# Patient Record
Sex: FEMALE | Race: WHITE | NOT HISPANIC OR LATINO | Employment: OTHER | URBAN - NONMETROPOLITAN AREA
[De-identification: names, ages, dates, MRNs, and addresses within clinical notes are randomized per-mention and may not be internally consistent; named-entity substitution may affect disease eponyms.]

---

## 2020-02-18 ENCOUNTER — APPOINTMENT (EMERGENCY)
Dept: RADIOLOGY | Facility: HOSPITAL | Age: 75
End: 2020-02-18
Payer: MEDICARE

## 2020-02-18 ENCOUNTER — HOSPITAL ENCOUNTER (EMERGENCY)
Facility: HOSPITAL | Age: 75
Discharge: HOME | End: 2020-02-18
Attending: EMERGENCY MEDICINE | Admitting: EMERGENCY MEDICINE
Payer: MEDICARE

## 2020-02-18 VITALS
BODY MASS INDEX: 40.58 KG/M2 | OXYGEN SATURATION: 100 % | RESPIRATION RATE: 20 BRPM | HEIGHT: 69 IN | DIASTOLIC BLOOD PRESSURE: 86 MMHG | TEMPERATURE: 98 F | WEIGHT: 274 LBS | SYSTOLIC BLOOD PRESSURE: 136 MMHG | HEART RATE: 90 BPM

## 2020-02-18 DIAGNOSIS — K59.00 CONSTIPATION: Primary | ICD-10-CM

## 2020-02-18 DIAGNOSIS — K59.00 CONSTIPATION, UNSPECIFIED CONSTIPATION TYPE: ICD-10-CM

## 2020-02-18 LAB
ALBUMIN SERPL BCP-MCNC: 4 G/DL (ref 3.5–5)
ALP SERPL-CCNC: 100 U/L (ref 46–116)
ALT SERPL W P-5'-P-CCNC: 22 U/L (ref 12–78)
ANION GAP SERPL CALCULATED.3IONS-SCNC: 9 MMOL/L (ref 4–13)
AST SERPL W P-5'-P-CCNC: 14 U/L (ref 5–45)
BILIRUB SERPL-MCNC: 0.48 MG/DL (ref 0.2–1)
BUN SERPL-MCNC: 14 MG/DL (ref 5–25)
CALCIUM SERPL-MCNC: 9.5 MG/DL (ref 8.3–10.1)
CHLORIDE SERPL-SCNC: 100 MMOL/L (ref 100–108)
CO2 SERPL-SCNC: 31 MMOL/L (ref 21–32)
CREAT SERPL-MCNC: 1.44 MG/DL (ref 0.6–1.3)
GFR SERPL CREATININE-BSD FRML MDRD: 35 ML/MIN/1.73SQ M
GLUCOSE SERPL-MCNC: 118 MG/DL (ref 65–140)
POTASSIUM SERPL-SCNC: 4.4 MMOL/L (ref 3.5–5.3)
PROT SERPL-MCNC: 8.2 G/DL (ref 6.4–8.2)
SODIUM SERPL-SCNC: 140 MMOL/L (ref 136–145)

## 2020-02-18 PROCEDURE — 99282 EMERGENCY DEPT VISIT SF MDM: CPT | Performed by: EMERGENCY MEDICINE

## 2020-02-18 PROCEDURE — 74022 RADEX COMPL AQT ABD SERIES: CPT

## 2020-02-18 PROCEDURE — 80053 COMPREHEN METABOLIC PANEL: CPT | Performed by: EMERGENCY MEDICINE

## 2020-02-18 PROCEDURE — 99284 EMERGENCY DEPT VISIT MOD MDM: CPT

## 2020-02-18 PROCEDURE — 36415 COLL VENOUS BLD VENIPUNCTURE: CPT | Performed by: EMERGENCY MEDICINE

## 2020-02-18 RX ORDER — LEVOTHYROXINE SODIUM 0.12 MG/1
125 TABLET ORAL DAILY
COMMUNITY

## 2020-02-18 RX ORDER — ASPIRIN 81 MG/1
81 TABLET, CHEWABLE ORAL DAILY
COMMUNITY

## 2020-02-18 RX ORDER — TELMISARTAN AND HYDROCHLORTHIAZIDE 80; 25 MG/1; MG/1
1 TABLET ORAL DAILY
COMMUNITY

## 2020-02-18 RX ORDER — AMLODIPINE BESYLATE 5 MG/1
5 TABLET ORAL DAILY
COMMUNITY
End: 2022-07-16

## 2020-02-18 RX ORDER — FUROSEMIDE 20 MG/1
10 TABLET ORAL AS NEEDED
COMMUNITY

## 2020-02-18 RX ORDER — BISOPROLOL FUMARATE 10 MG/1
10 TABLET ORAL DAILY
COMMUNITY

## 2020-02-18 RX ORDER — INSULIN ASPART 100 [IU]/ML
60 INJECTION, SUSPENSION SUBCUTANEOUS
COMMUNITY

## 2020-02-18 NOTE — DISCHARGE INSTRUCTIONS
You may use magnesium citrate which is available over-the-counter  Please follow-up with your primary care physician if you are not improving  Your calcium level today was found to be normal     Your imaging studies have been preliminarily reviewed by the emergency department  Further review by Radiology is pending at this time  If the discrepancy or a finding of additional concern is identified, we will attempt to contact you at the number you have provided  Follow-up with your primary care provider within 1-2 weeks is recommended in all cases if you do not hear from us to ensure that all findings were normal or as initially reported  Your results may also be available on [facility]      Thank you for choosing the emergency department at [facility]  We appreciated the opportunity and privilege to address your healthcare needs  We remain available to you should you require additional evaluation or assistance  We value your feedback and would appreciate the opportunity to address anything you identified as an opportunity to improve or where we excelled  If there are colleagues who deserve special recognition, please let us know! We hope you are feeling better soon!

## 2020-02-18 NOTE — ED PROVIDER NOTES
History  Chief Complaint   Patient presents with    Constipation     pt  states she has been constipated, pt  hasn'y had a "decent" bowel movement for weeks, she did pass a small amount of stool today but with no relief, pt  alsop reports feeling lightheaded and legs got numb from the knees down b/l, pt  called PCP but  no return call all day today, denies n/v     Patient to the emergency room reporting that she has been unable to significantly move her bowels for last 2 weeks  She feels as if she is bloated  She denies any pain  She has had no nausea or vomiting  She has no fevers  She states that over the last 24 hours she has felt anxious about this  She denies any new medications specifically any opioid pain medications  She denies any over-the-counter medications or other changes in at her normal meds  There is changed her diet and no recent travel  She does have a history of diabetes and also reports that as of late she has been feeling some stocking-glove type numbness in her lower extremities from about the level of the knees on down words  She is however ambulatory as per her baseline which is usually with a walker or cane  She does not appear acutely toxic in the emergency department        History provided by:  Patient and spouse  Constipation   Severity:  Moderate  Timing:  Constant  Progression:  Unchanged  Chronicity:  New  Context: not dehydration, not dietary changes, not medication, not narcotics and not stress    Stool description:  Hard and pellet like  Relieved by:  Nothing  Worsened by:  Nothing  Ineffective treatments:  Laxatives and stool softeners  Associated symptoms: flatus    Associated symptoms: no abdominal pain, no back pain, no diarrhea, no dysuria, no fever, no nausea, no urinary retention and no vomiting    Risk factors: hx of abdominal surgery, recent illness and recent surgery    Risk factors: no change in medication and no recent antibiotic use        Prior to Admission Medications   Prescriptions Last Dose Informant Patient Reported? Taking? amLODIPine (NORVASC) 5 mg tablet   Yes Yes   Sig: Take 5 mg by mouth daily   aspirin 81 mg chewable tablet   Yes Yes   Sig: Chew 81 mg daily   bisoprolol (ZEBETA) 10 MG tablet   Yes Yes   Sig: Take 10 mg by mouth daily   furosemide (LASIX) 20 mg tablet   Yes Yes   Sig: Take 10 mg by mouth as needed   insulin aspart protamine-insulin aspart (NovoLOG 70/30) 100 units/mL injection   Yes Yes   Sig: Inject 60 Units under the skin 2 (two) times a day before meals   levothyroxine 125 mcg tablet   Yes Yes   Sig: Take 125 mcg by mouth daily   metFORMIN (GLUCOPHAGE) 500 mg tablet   Yes Yes   Sig: Take 1,000 mg by mouth 2 (two) times a day with meals   telmisartan-hydrochlorothiazide (MICARDIS HCT) 80-25 MG per tablet   Yes Yes   Sig: Take 1 tablet by mouth daily      Facility-Administered Medications: None       Past Medical History:   Diagnosis Date    Diabetes mellitus ([facility] [address] )     Disease of thyroid gland     Hyperlipidemia     Hypertension     Stroke [facility])        Past Surgical History:   Procedure Laterality Date    HYSTERECTOMY      JOINT REPLACEMENT      REPLACEMENT TOTAL KNEE      THYROIDECTOMY      TOTAL HIP ARTHROPLASTY         History reviewed  No pertinent family history  I have reviewed and agree with the history as documented  Social History     Tobacco Use    Smoking status: Never Smoker    Smokeless tobacco: Never Used   Substance Use Topics    Alcohol use: Not Currently    Drug use: Never       Review of Systems   Constitutional: Negative for diaphoresis and fever  HENT: Negative for congestion, ear pain, rhinorrhea, sinus pressure, sinus pain, sore throat and tinnitus  Eyes: Negative for discharge, itching and visual disturbance  Respiratory: Negative for cough, chest tightness, shortness of breath and wheezing  Cardiovascular: Negative for chest pain, palpitations and leg swelling     Gastrointestinal: Positive for constipation and flatus  Negative for abdominal pain, blood in stool, diarrhea, nausea and vomiting  Endocrine: Negative for polydipsia and polyuria  Genitourinary: Negative for decreased urine volume, dysuria and flank pain  Musculoskeletal: Negative for arthralgias and back pain  Skin: Negative for pallor and rash  Neurological: Negative for dizziness, weakness and headaches  Hematological: Does not bruise/bleed easily  Psychiatric/Behavioral: Negative for sleep disturbance  The patient is not nervous/anxious  All other systems reviewed and are negative  Physical Exam  Physical Exam   Constitutional: She is oriented to person, place, and time  She appears well-developed and well-nourished  HENT:   Head: Normocephalic and atraumatic  Eyes: Pupils are equal, round, and reactive to light  EOM are normal    Neck: Normal range of motion  Neck supple  Cardiovascular: Normal rate, regular rhythm and normal heart sounds  No murmur heard  Pulmonary/Chest: Effort normal and breath sounds normal  No stridor  No respiratory distress  She has no wheezes  She has no rales  She exhibits no tenderness  Abdominal: Soft  Bowel sounds are normal  She exhibits distension  There is no tenderness  There is no rebound and no guarding  Musculoskeletal: Normal range of motion  She exhibits no edema or deformity  Neurological: She is alert and oriented to person, place, and time  No cranial nerve deficit  Skin: Skin is warm and dry  No rash noted  No pallor  Psychiatric: She has a normal mood and affect  Her behavior is normal    Nursing note and vitals reviewed        Vital Signs  ED Triage Vitals [02/18/20 1614]   Temperature Pulse Respirations Blood Pressure SpO2   98 4 °F (36 9 °C) 84 20 164/72 100 %      Temp Source Heart Rate Source Patient Position - Orthostatic VS BP Location FiO2 (%)   Oral Monitor Sitting Right arm --      Pain Score       No Pain           Vitals:    02/18/20 1614   BP: 164/72   Pulse: 84   Patient Position - Orthostatic VS: Sitting         Visual Acuity      ED Medications  Medications - No data to display    Diagnostic Studies  Results Reviewed     Procedure Component Value Units Date/Time    Comprehensive metabolic panel [[id number]]  (Abnormal) Collected:  02/18/20 1716    Lab Status:  Final result Specimen:  Blood from Arm, Left Updated:  02/18/20 1737     Sodium 140 mmol/L      Potassium 4 4 mmol/L      Chloride 100 mmol/L      CO2 31 mmol/L      ANION GAP 9 mmol/L      BUN 14 mg/dL      Creatinine 1 44 mg/dL      Glucose 118 mg/dL      Calcium 9 5 mg/dL      AST 14 U/L      ALT 22 U/L      Alkaline Phosphatase 100 U/L      Total Protein 8 2 g/dL      Albumin 4 0 g/dL      Total Bilirubin 0 48 mg/dL      eGFR 35 ml/min/1 73sq m     Narrative:       [place] guidelines for Chronic Kidney Disease (CKD):     Stage 1 with normal or high GFR (GFR > 90 mL/min/1 73 square meters)    Stage 2 Mild CKD (GFR = 60-89 mL/min/1 73 square meters)    Stage 3A Moderate CKD (GFR = 45-59 mL/min/1 73 square meters)    Stage 3B Moderate CKD (GFR = 30-44 mL/min/1 73 square meters)    Stage 4 Severe CKD (GFR = 15-29 mL/min/1 73 square meters)    Stage 5 End Stage CKD (GFR <15 mL/min/1 73 square meters)  Note: GFR calculation is accurate only with a steady state creatinine                 XR abdomen obstruction series   ED Interpretation by Yordan Chan DO (02/18 1814)   No obstruction and no free air  Procedures  Procedures         ED Course  ED Course as of Feb 18 1815   Tue Feb 18, 2020   1741 Calcium: 9 5 1812 Patient had a bowel movement after the enema and she was advised to use magnesium citrate at home  Stable for discharge                                    MDM      Disposition  Final diagnoses:   Constipation   Constipation, unspecified constipation type     Time reflects when diagnosis was documented in both MDM as applicable and the Disposition within this note     Time User Action Codes Description Comment    2/18/2020  4:51 PM Ioana Mckeon Add [K59 00] Constipation     2/18/2020  5:57 PM Yodit Gardner Add [K59 00] Constipation, unspecified constipation type       ED Disposition     ED Disposition Condition Date/Time Comment    Discharge Stable Tue Feb 18, 2020  6:12 PM Leann Chavez discharge to home/self care  Follow-up Information     Follow up With Specialties Details Why Contact Info    Quirino Andre MD  In 2 days If not better [address] [facility]  [phone]            Patient's Medications   Discharge Prescriptions    No medications on file     No discharge procedures on file      PDMP Review     None          ED Provider  Electronically Signed by           Yordan Chan DO  02/18/20 3143

## 2021-02-02 ENCOUNTER — APPOINTMENT (EMERGENCY)
Dept: CT IMAGING | Facility: HOSPITAL | Age: 76
End: 2021-02-02
Payer: MEDICARE

## 2021-02-02 ENCOUNTER — APPOINTMENT (EMERGENCY)
Dept: MRI IMAGING | Facility: HOSPITAL | Age: 76
End: 2021-02-02
Payer: MEDICARE

## 2021-02-02 ENCOUNTER — HOSPITAL ENCOUNTER (EMERGENCY)
Facility: HOSPITAL | Age: 76
Discharge: HOME | End: 2021-02-02
Attending: EMERGENCY MEDICINE | Admitting: EMERGENCY MEDICINE
Payer: MEDICARE

## 2021-02-02 VITALS
BODY MASS INDEX: 39.3 KG/M2 | SYSTOLIC BLOOD PRESSURE: 178 MMHG | HEART RATE: 77 BPM | RESPIRATION RATE: 19 BRPM | WEIGHT: 266.1 LBS | OXYGEN SATURATION: 98 % | DIASTOLIC BLOOD PRESSURE: 77 MMHG | TEMPERATURE: 97.9 F
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
## 2021-02-02 DIAGNOSIS — S09.90XA INJURY OF HEAD, INITIAL ENCOUNTER: ICD-10-CM

## 2021-02-02 DIAGNOSIS — S01.01XA LACERATION OF SCALP, INITIAL ENCOUNTER: ICD-10-CM

## 2021-02-02 LAB
ANION GAP SERPL CALCULATED.3IONS-SCNC: 13 MMOL/L (ref 4–13)
BASOPHILS # BLD AUTO: 0.08 THOUSANDS/ΜL (ref 0–0.1)
BASOPHILS NFR BLD AUTO: 1 % (ref 0–1)
BUN SERPL-MCNC: 35 MG/DL (ref 5–25)
CALCIUM SERPL-MCNC: 8.7 MG/DL (ref 8.3–10.1)
CHLORIDE SERPL-SCNC: 98 MMOL/L (ref 100–108)
CO2 SERPL-SCNC: 26 MMOL/L (ref 21–32)
CREAT SERPL-MCNC: 1.65 MG/DL (ref 0.6–1.3)
EOSINOPHIL # BLD AUTO: 0.42 THOUSAND/ΜL (ref 0–0.61)
EOSINOPHIL NFR BLD AUTO: 4 % (ref 0–6)
ERYTHROCYTE [DISTWIDTH] IN BLOOD BY AUTOMATED COUNT: 14.3 % (ref 11.6–15.1)
GFR SERPL CREATININE-BSD FRML MDRD: 30 ML/MIN/1.73SQ M
GLUCOSE SERPL-MCNC: 258 MG/DL (ref 65–140)
HCT VFR BLD AUTO: 31.7 % (ref 34.8–46.1)
HGB BLD-MCNC: 10.7 G/DL (ref 11.5–15.4)
IMM GRANULOCYTES # BLD AUTO: 0.06 THOUSAND/UL (ref 0–0.2)
IMM GRANULOCYTES NFR BLD AUTO: 1 % (ref 0–2)
LYMPHOCYTES # BLD AUTO: 1.16 THOUSANDS/ΜL (ref 0.6–4.47)
LYMPHOCYTES NFR BLD AUTO: 11 % (ref 14–44)
MCH RBC QN AUTO: 29.1 PG (ref 26.8–34.3)
MCHC RBC AUTO-ENTMCNC: 33.8 G/DL (ref 31.4–37.4)
MCV RBC AUTO: 86 FL (ref 82–98)
MONOCYTES # BLD AUTO: 0.69 THOUSAND/ΜL (ref 0.17–1.22)
MONOCYTES NFR BLD AUTO: 6 % (ref 4–12)
NEUTROPHILS # BLD AUTO: 8.59 THOUSANDS/ΜL (ref 1.85–7.62)
NEUTS SEG NFR BLD AUTO: 77 % (ref 43–75)
NRBC BLD AUTO-RTO: 0 /100 WBCS
PLATELET # BLD AUTO: 241 THOUSANDS/UL (ref 149–390)
PMV BLD AUTO: 8.9 FL (ref 8.9–12.7)
POTASSIUM SERPL-SCNC: 3.6 MMOL/L (ref 3.5–5.3)
RBC # BLD AUTO: 3.68 MILLION/UL (ref 3.81–5.12)
SODIUM SERPL-SCNC: 137 MMOL/L (ref 136–145)
WBC # BLD AUTO: 11 THOUSAND/UL (ref 4.31–10.16)

## 2021-02-02 PROCEDURE — 70551 MRI BRAIN STEM W/O DYE: CPT

## 2021-02-02 PROCEDURE — 80048 BASIC METABOLIC PNL TOTAL CA: CPT | Performed by: EMERGENCY MEDICINE

## 2021-02-02 PROCEDURE — 85025 COMPLETE CBC W/AUTO DIFF WBC: CPT | Performed by: EMERGENCY MEDICINE

## 2021-02-02 PROCEDURE — G1004 CDSM NDSC: HCPCS

## 2021-02-02 PROCEDURE — 93005 ELECTROCARDIOGRAM TRACING: CPT

## 2021-02-02 PROCEDURE — 36415 COLL VENOUS BLD VENIPUNCTURE: CPT | Performed by: EMERGENCY MEDICINE

## 2021-02-02 PROCEDURE — 99285 EMERGENCY DEPT VISIT HI MDM: CPT | Performed by: EMERGENCY MEDICINE

## 2021-02-02 PROCEDURE — 70450 CT HEAD/BRAIN W/O DYE: CPT

## 2021-02-02 PROCEDURE — 99284 EMERGENCY DEPT VISIT MOD MDM: CPT

## 2021-02-02 PROCEDURE — 12002 RPR S/N/AX/GEN/TRNK2.6-7.5CM: CPT | Performed by: EMERGENCY MEDICINE

## 2021-02-02 RX ORDER — LIDOCAINE HYDROCHLORIDE AND EPINEPHRINE 10; 10 MG/ML; UG/ML
20 INJECTION, SOLUTION INFILTRATION; PERINEURAL ONCE
Status: DISCONTINUED | OUTPATIENT
Start: 2021-02-02 | End: 2021-02-02 | Stop reason: HOSPADM

## 2021-02-02 NOTE — ED PROVIDER NOTES
Emergency Department Trauma Note  Norbert Cueto 66 y o  female MRN: [id number]  Unit/Bed#: ED 05/ED 05 Encounter: [id number]      Trauma Alert: Trauma Acuity: Trauma Evaluation  Model of Arrival:   via    Trauma Team: Current Providers  Attending Provider: Barbra Baker DO  Registered Nurse: Robson Hough RN  Registered Nurse: Benita Sanchez RN  Consultants: None      History of Present Illness     Chief Complaint:   Chief Complaint   Patient presents with   Marguarite Neri     pt lost balance while in dining room falling backwards hitting posterior head against radiator  pt c/o headache  denies loc  pt takes aspirin daily  HPI:  Norbert Cueto is a 66 y o  female who presents with fall/head injury  Mechanism:Details of Incident: pt lost balance falling backwards in dining room hitting posterior head against radiator  pt c/o headache  denies loc  laceration posterior head  Injury Date: 02/02/21 Injury Time: 1322 Injury Occurence Location - [address] [place] [address] Way: sunny     Patient is a 77-year-old female brought to the emergency department by spouse secondary to head injury secondary to fall, patient states she misstepped and tripped on a rug, causing her to fall backwards and hitting her head on a cast iron piece of furniture, this caused a laceration, she denies any loss of consciousness, no nausea or vomiting, denies pain or injury other than posterior scalp, she does currently take aspirin 81 mg daily        Review of Systems   Constitutional: Negative  HENT: Negative  Eyes: Negative  Respiratory: Negative  Cardiovascular: Negative  Gastrointestinal: Negative  Endocrine: Negative  Genitourinary: Negative  Musculoskeletal: Negative  Skin: Negative  Allergic/Immunologic: Negative  Neurological: Positive for headaches (Head injury, scalp laceration and tenderness)  Hematological: Negative  Psychiatric/Behavioral: Negative          Historical Information     Immunizations: There is no immunization history on file for this patient  Past Medical History:   Diagnosis Date    Diabetes mellitus ([facility] [address] )     Disease of thyroid gland     Hyperlipidemia     Hypertension     [facility]      No family history on file  Past Surgical History:   Procedure Laterality Date    HYSTERECTOMY      JOINT REPLACEMENT      REPLACEMENT TOTAL KNEE      THYROIDECTOMY      TOTAL HIP ARTHROPLASTY       Social History     Tobacco Use    Smoking status: Never Smoker    Smokeless tobacco: Never Used   Substance Use Topics    Alcohol use: Not Currently    Drug use: Never     E-Cigarette/Vaping    E-Cigarette Use Never User      E-Cigarette/Vaping Substances    Nicotine No     THC No     CBD No     Flavoring No     Other No        Family History: non-contributory    Meds/Allergies   Prior to Admission Medications   Prescriptions Last Dose Informant Patient Reported? Taking?    amLODIPine (NORVASC) 5 mg tablet   Yes No   Sig: Take 5 mg by mouth daily   aspirin 81 mg chewable tablet   Yes No   Sig: Chew 81 mg daily   bisoprolol (ZEBETA) 10 MG tablet   Yes No   Sig: Take 10 mg by mouth daily   furosemide (LASIX) 20 mg tablet   Yes No   Sig: Take 10 mg by mouth as needed   insulin aspart protamine-insulin aspart (NovoLOG 70/30) 100 units/mL injection   Yes No   Sig: Inject 60 Units under the skin 2 (two) times a day before meals   levothyroxine 125 mcg tablet   Yes No   Sig: Take 125 mcg by mouth daily   metFORMIN (GLUCOPHAGE) 500 mg tablet   Yes No   Sig: Take 1,000 mg by mouth 2 (two) times a day with meals   telmisartan-hydrochlorothiazide (MICARDIS HCT) 80-25 MG per tablet   Yes No   Sig: Take 1 tablet by mouth daily      Facility-Administered Medications: None       No Known Allergies    PHYSICAL EXAM    PE limited by: NA    Objective   Vitals:   First set: Temperature: 99 °F (37 2 °C) (02/02/21 1425)  Pulse: 89 (02/02/21 1425)  Respirations: 17 (02/02/21 1425)  Blood Pressure: (!) 192/77 (02/02/21 1425)  SpO2: 99 % (02/02/21 1425)    Primary Survey:   (A) Airway: intact  (B) Breathing: intact  (C) Circulation: Pulses:   normal  (D) Disabliity:  GCS Total:  15  (E) Expose:  Completed    Secondary Survey: (Click on Physical Exam tab above)  Physical Exam  Constitutional:       Appearance: She is well-developed  HENT:      Head: Normocephalic  Comments: Moderate-sized hematoma to posterior scalp, bleeding controlled, small laceration     Nose: Nose normal       Mouth/Throat:      Mouth: Mucous membranes are moist    Eyes:      Conjunctiva/sclera: Conjunctivae normal       Pupils: Pupils are equal, round, and reactive to light  Neck:      Musculoskeletal: Full passive range of motion without pain, normal range of motion and neck supple  No spinous process tenderness or muscular tenderness  Cardiovascular:      Rate and Rhythm: Normal rate  Pulmonary:      Effort: Pulmonary effort is normal    Abdominal:      Palpations: Abdomen is soft  Musculoskeletal: Normal range of motion  Skin:     General: Skin is warm and dry  Neurological:      Mental Status: She is alert and oriented to person, place, and time  Cervical spine cleared by clinical criteria?  Yes     Invasive Devices     None                 Lab Results:   Results Reviewed     Procedure Component Value Units Date/Time    Basic metabolic panel [[id number]]  (Abnormal) Collected: 02/02/21 1523    Lab Status: Final result Specimen: Blood from Hand, Right Updated: 02/02/21 1551     Sodium 137 mmol/L      Potassium 3 6 mmol/L      Chloride 98 mmol/L      CO2 26 mmol/L      ANION GAP 13 mmol/L      BUN 35 mg/dL      Creatinine 1 65 mg/dL      Glucose 258 mg/dL      Calcium 8 7 mg/dL      eGFR 30 ml/min/1 73sq m     Narrative:      [place] guidelines for Chronic Kidney Disease (CKD):     Stage 1 with normal or high GFR (GFR > 90 mL/min/1 73 square meters)    Stage 2 Mild CKD (GFR = 60-89 mL/min/1 73 square meters)    Stage 3A Moderate CKD (GFR = 45-59 mL/min/1 73 square meters)    Stage 3B Moderate CKD (GFR = 30-44 mL/min/1 73 square meters)    Stage 4 Severe CKD (GFR = 15-29 mL/min/1 73 square meters)    Stage 5 End Stage CKD (GFR <15 mL/min/1 73 square meters)  Note: GFR calculation is accurate only with a steady state creatinine    CBC and differential [[id number]]  (Abnormal) Collected: 02/02/21 1523    Lab Status: Final result Specimen: Blood from Hand, Right Updated: 02/02/21 1531     WBC 11 00 Thousand/uL      RBC 3 68 Million/uL      Hemoglobin 10 7 g/dL      Hematocrit 31 7 %      MCV 86 fL      MCH 29 1 pg      MCHC 33 8 g/dL      RDW 14 3 %      MPV 8 9 fL      Platelets 112 Thousands/uL      nRBC 0 /100 WBCs      Neutrophils Relative 77 %      Immat GRANS % 1 %      Lymphocytes Relative 11 %      Monocytes Relative 6 %      Eosinophils Relative 4 %      Basophils Relative 1 %      Neutrophils Absolute 8 59 Thousands/µL      Immature Grans Absolute 0 06 Thousand/uL      Lymphocytes Absolute 1 16 Thousands/µL      Monocytes Absolute 0 69 Thousand/µL      Eosinophils Absolute 0 42 Thousand/µL      Basophils Absolute 0 08 Thousands/µL                  Imaging Studies:   Direct to CT: Yes  MRI brain wo contrast   Final Result by Chantel Hennessy MD (02/02 1656)      No acute intracranial hemorrhage seen      There is no lesion corresponding to the abnormality noted on the CT in the region of the brainstem  A nodular focus seen within the left internal auditory canal in image 4 series 7 measuring about 3 mm, raises concern for incidental schwannoma  Consider follow-up nonemergent MRI with IAC protocol         An extra-axial lesion in the right frontal region in the region of the frontal operculum measuring 1 x 0 6 cm with appearance most suggestive of a meningioma        Workstation performed: [id number]         TRAUMA - CT head wo contrast   Final Result by Uvaldo Lester MD (45/86 8002) Hyperdensity in the brainstem does not appear to be related to just artifact and underlying hemorrhage or structural lesion could be present possibly mildly hyperdense cavernous angioma         Follow-up with MRI with and without contrast is advised  Circumscribed lesion adjacent to right temporal lobe measuring 1 cm probably represents a meningioma  Scalp soft tissue swelling without evidence of hemorrhage elsewhere  This was discussed with Dr Cedric De Leon at 3:00 PM                   Workstation performed: [id number]               Procedures  ECG 12 Lead Documentation Only    Date/Time: 2/2/2021 2:32 PM  Performed by: David Lai DO  Authorized by: David Lai DO     Indications / Diagnosis:  Trauma evaluation  ECG reviewed by me, the ED Provider: yes    Patient location:  ED  Previous ECG:     Previous ECG:  Unavailable    Comparison to cardiac monitor: Yes    Interpretation:     Interpretation: normal    Rate:     ECG rate:  84    ECG rate assessment: normal    Rhythm:     Rhythm: sinus rhythm    Ectopy:     Ectopy: none    QRS:     QRS intervals:  Normal  Conduction:     Conduction: normal    ST segments:     ST segments:  Normal  T waves:     T waves: normal    Q waves:     Q waves:  II, III, aVL, aVF, I, V4, V5 and V6             ED Course  ED Course as of Feb 02 2207   Tue Feb 02, 2021   1422   On exam, the patient had no midline point tenderness or paresthesias/numbness/weakness in the extremities  The patient had full range of motion (was then able to flex, extend, and rotate head laterally) without pain  There were no distracting injuries and the patient was not intoxicated  The patient's cervical spine was cleared clinically       David Lai DO  2/2/2021 2:22 PM     Patient does not require chest x-ray or pelvic x-ray as she has no chest pain and no pain in the pelvis      2206 Imaging findings discussed at bedside, including incidental findings of schwannoma and meningioma, MRI findings otherwise unremarkable, wound repaired using staples, patient given wound care instructions and instructions for follow-up, advised to return if symptoms worsen or any additional concerns, patient acknowledges understanding and agreement with this plan                    Disposition  [facility] Transfer: No  Final diagnoses:   Fall, initial encounter   Injury of head, initial encounter   Laceration of scalp, initial encounter     Time reflects when diagnosis was documented in both MDM as applicable and the Disposition within this note     Time User Action Codes Description Comment    2/2/2021  5:11 PM Markus Velasco Yoanna Garcia Fall, initial encounter     2/2/2021  5:11 PM [facility] [E44 [id number]] Injury of head, initial encounter     2/2/2021  5:12 PM Markus Velasco [S01 [id number]] Laceration of scalp, initial encounter       ED Disposition     ED Disposition Condition Date/Time Comment    Discharge Stable Tue Feb 2, 2021  5:11 PM Gregg Sender discharge to home/self care  Follow-up Information     Follow up With Specialties Details Why Contact Info    Jad Ventura MD  In 2 days  [address]          Discharge Medication List as of 2/2/2021  6:08 PM      CONTINUE these medications which have NOT CHANGED    Details   amLODIPine (NORVASC) 5 mg tablet Take 5 mg by mouth daily, Historical Med      aspirin 81 mg chewable tablet Chew 81 mg daily, Historical Med      bisoprolol (ZEBETA) 10 MG tablet Take 10 mg by mouth daily, Historical Med      furosemide (LASIX) 20 mg tablet Take 10 mg by mouth as needed, Historical Med      insulin aspart protamine-insulin aspart (NovoLOG 70/30) 100 units/mL injection Inject 60 Units under the skin 2 (two) times a day before meals, Historical Med      levothyroxine 125 mcg tablet Take 125 mcg by mouth daily, Historical Med      metFORMIN (GLUCOPHAGE) 500 mg tablet Take 1,000 mg by mouth 2 (two) times a day with meals, Historical Med      telmisartan-hydrochlorothiazide (MICARDIS HCT) 80-25 MG per tablet Take 1 tablet by mouth daily, Historical Med           No discharge procedures on file      PDMP Review     None          ED Provider  Electronically Signed by         Aureliano Pace DO  02/02/21 3251

## 2021-02-02 NOTE — DISCHARGE INSTRUCTIONS
Staples to be removed in 7-10 days  Gently a rinse wound with warm water and soap  Apply antibiotic ointment as needed  Follow-up with primary care provider in 2-3 days as needed  Return to the emergency department immediately if symptoms worsen or any additional concerns

## 2021-02-03 NOTE — ED PROCEDURE NOTE
Procedure  Laceration repair    Date/Time: 2/2/2021 2:33 PM  Performed by: Rodo Whalen PA-C  Authorized by: Rodo Whalen PA-C   Consent: Verbal consent obtained  Consent given by: patient  Patient identity confirmed: verbally with patient and arm band  Time out: Immediately prior to procedure a "time out" was called to verify the correct patient, procedure, equipment, support staff and site/side marked as required  Body area: head/neck  Location details: scalp  Laceration length: 3 cm  Foreign bodies: no foreign bodies  Tendon involvement: none  Nerve involvement: none  Vascular damage: no    Wound Dehiscence:  Superficial Wound Dehiscence: simple closure      Procedure Details:  Irrigation solution: saline  Irrigation method: syringe  Amount of cleaning: extensive  Debridement: none  Degree of undermining: none  Skin closure: staples  Number of sutures: 4 staples    Approximation: close  Approximation difficulty: simple  Patient tolerance: patient tolerated the procedure well with no immediate complications                       Rodo Whalen PA-C  02/02/21 1915

## 2021-02-07 LAB
ATRIAL RATE: 84 BPM
P AXIS: 74 DEGREES
PR INTERVAL: 176 MS
QRS AXIS: 32 DEGREES
QRSD INTERVAL: 104 MS
QT INTERVAL: 400 MS
QTC INTERVAL: 472 MS
T WAVE AXIS: 56 DEGREES
VENTRICULAR RATE: 84 BPM

## 2021-02-07 PROCEDURE — 93010 ELECTROCARDIOGRAM REPORT: CPT | Performed by: INTERNAL MEDICINE

## 2022-07-16 ENCOUNTER — OFFICE VISIT (OUTPATIENT)
Dept: URGENT CARE | Facility: CLINIC | Age: 77
End: 2022-07-16
Payer: MEDICARE

## 2022-07-16 VITALS
WEIGHT: 218 LBS | RESPIRATION RATE: 17 BRPM | BODY MASS INDEX: 31.21 KG/M2 | OXYGEN SATURATION: 97 % | TEMPERATURE: 97.9 F | HEIGHT: 70 IN | HEART RATE: 85 BPM

## 2022-07-16 DIAGNOSIS — R43.0 ANOSMIA: Primary | ICD-10-CM

## 2022-07-16 DIAGNOSIS — J06.9 UPPER RESPIRATORY TRACT INFECTION, UNSPECIFIED TYPE: ICD-10-CM

## 2022-07-16 DIAGNOSIS — U07.1 COVID: ICD-10-CM

## 2022-07-16 LAB
SARS-COV-2 AG UPPER RESP QL IA: POSITIVE
VALID CONTROL: ABNORMAL

## 2022-07-16 PROCEDURE — G0463 HOSPITAL OUTPT CLINIC VISIT: HCPCS | Performed by: PHYSICIAN ASSISTANT

## 2022-07-16 PROCEDURE — 99213 OFFICE O/P EST LOW 20 MIN: CPT | Performed by: PHYSICIAN ASSISTANT

## 2022-07-16 PROCEDURE — 87811 SARS-COV-2 COVID19 W/OPTIC: CPT | Performed by: PHYSICIAN ASSISTANT

## 2022-07-16 RX ORDER — MINOCYCLINE HYDROCHLORIDE 100 MG/1
CAPSULE ORAL
COMMUNITY
Start: 2022-06-14

## 2022-07-16 RX ORDER — DULAGLUTIDE 0.75 MG/.5ML
INJECTION, SOLUTION SUBCUTANEOUS
COMMUNITY
Start: 2022-05-22

## 2022-07-16 RX ORDER — METOPROLOL SUCCINATE 50 MG/1
TABLET, EXTENDED RELEASE ORAL
COMMUNITY
Start: 2020-09-05

## 2022-07-16 RX ORDER — METFORMIN HYDROCHLORIDE 500 MG/1
TABLET, EXTENDED RELEASE ORAL
COMMUNITY

## 2022-07-16 RX ORDER — ATORVASTATIN CALCIUM 20 MG/1
TABLET, FILM COATED ORAL
COMMUNITY
Start: 2020-09-05

## 2022-07-16 NOTE — PATIENT INSTRUCTIONS
COVID test, rapid, is positive  You are past the time where the oral antiviral for COVID would be recommended  Recommend that you contact your primary care office as soon as possible to inform of positive test results and see if other treatment is indicated  If you would rebound and start to have significant weakness, chest pain, shortness of breath then I would recommend going directly to emergency room for further evaluation

## 2022-07-16 NOTE — PROGRESS NOTES
[facility]    NAME: George Sullivan is a 64 y o  female  : 1945    MRN: [id number]  DATE: 2022  TIME: 9:52 AM    Assessment and Plan   Anosmia [R43 0]  1  Anosmia  Poct Covid 19 Rapid Antigen Test   2  Upper respiratory tract infection, unspecified type     3  COVID         Patient Instructions   Patient Instructions   COVID test, rapid, is positive  You are past the time where the oral antiviral for COVID would be recommended  Recommend that you contact your primary care office as soon as possible to inform of positive test results and see if other treatment is indicated  If you would rebound and start to have significant weakness, chest pain, shortness of breath then I would recommend going directly to emergency room for further evaluation  Chief Complaint     Chief Complaint   Patient presents with    Headache     Started on the  with headache and sinus drainage  is covid positive and she also lost her taste and smell        History of Present Illness   George Sullivan presents to the clinic c/o  20-year-old female comes in with her  after developing sore throat bad headache and fatigue on 2022  She has also lost her sense of taste and smell  She is starting to feel better  Headache has resolved and sore throat is improved  Still some fatigue but improving  Hx DM, HPLD, CVA 5 years ago  Orignial covid vaccines w/o boosters  Review of Systems   Review of Systems   Constitutional: Positive for appetite change and fatigue  Negative for activity change, chills and diaphoresis  Fatigue resolving   HENT: Positive for congestion, rhinorrhea and sore throat  Negative for sinus pain  Sore throat resolved  Still some PND   Neurological: Positive for headaches  Resolved HA    Loss of taste, smell       Current Medications     Long-Term Medications   Medication Sig Dispense Refill    atorvastatin (LIPITOR) 20 mg tablet       Dulaglutide (Trulicity) 0 27 YF/1 8WS SOPN       metoprolol succinate (TOPROL-XL) 50 mg 24 hr tablet       aspirin 81 mg chewable tablet Chew 81 mg daily      bisoprolol (ZEBETA) 10 MG tablet Take 10 mg by mouth daily      furosemide (LASIX) 20 mg tablet Take 10 mg by mouth as needed      insulin aspart protamine-insulin aspart (NovoLOG 70/30) 100 units/mL injection Inject 60 Units under the skin 2 (two) times a day before meals      levothyroxine 125 mcg tablet Take 125 mcg by mouth daily      metFORMIN (GLUCOPHAGE) 500 mg tablet Take 1,000 mg by mouth 2 (two) times a day with meals      metFORMIN (GLUCOPHAGE-XR) 500 mg 24 hr tablet       telmisartan-hydrochlorothiazide (MICARDIS HCT) 80-25 MG per tablet Take 1 tablet by mouth daily         Current Allergies     Allergies as of 07/16/2022    (No Known Allergies)          The following portions of the patient's history were reviewed and updated as appropriate: allergies, current medications, past family history, past medical history, past social history, past surgical history and problem list   Past Medical History:   Diagnosis Date    Diabetes mellitus ([address] )     Disease of thyroid gland     Hyperlipidemia     Hypertension     Stroke ([facility] [address] )      Past Surgical History:   Procedure Laterality Date    HYSTERECTOMY      JOINT REPLACEMENT      REPLACEMENT TOTAL KNEE      THYROIDECTOMY      TOTAL HIP ARTHROPLASTY       History reviewed  No pertinent family history  Objective   Pulse 85   Temp 97 9 °F (36 6 °C)   Resp 17   Ht 5' 10" (1 778 m)   Wt 98 9 kg (218 lb)   SpO2 97%   BMI 31 28 kg/m²   No LMP recorded  Patient has had a hysterectomy  Physical Exam     Physical Exam  Vitals and nursing note reviewed  Constitutional:       General: She is not in acute distress  Appearance: Normal appearance  She is not ill-appearing, toxic-appearing or diaphoretic  Comments: Appears mildly ill but in no acute distress     HENT:      Head: Normocephalic and atraumatic  Nose: No congestion or rhinorrhea  Mouth/Throat:      Mouth: Mucous membranes are moist       Pharynx: No oropharyngeal exudate or posterior oropharyngeal erythema  Comments: Cobblestoning posterior pharynx  Eyes:      General: No scleral icterus  Right eye: No discharge  Left eye: No discharge  Extraocular Movements: Extraocular movements intact  Conjunctiva/sclera: Conjunctivae normal       Pupils: Pupils are equal, round, and reactive to light  Cardiovascular:      Rate and Rhythm: Normal rate and regular rhythm  Heart sounds: Murmur heard  No friction rub  No gallop  Pulmonary:      Effort: Pulmonary effort is normal  No respiratory distress  Breath sounds: Normal breath sounds  No stridor  No wheezing, rhonchi or rales  Musculoskeletal:      Cervical back: Normal range of motion and neck supple  No rigidity or tenderness  No muscular tenderness  Lymphadenopathy:      Cervical: No cervical adenopathy  Skin:     General: Skin is warm and dry  Coloration: Skin is not pale  Findings: No rash  Comments: No acute rashes   Neurological:      Mental Status: She is alert and oriented to person, place, and time     Psychiatric:         Mood and Affect: Mood normal          Behavior: Behavior normal